# Patient Record
(demographics unavailable — no encounter records)

---

## 2024-10-30 NOTE — DISCUSSION/SUMMARY
[FreeTextEntry1] : ASSESSMENT - Buckle fracture left distal radius with persistent pain and recent onset edema; now spontaneously resolved - No evidence of additional complications or displacement of the fracture  PLAN - Continue wearing the splint 24/7 for another week - After two weeks, wear the splint during school, sleep, and any physical exertion for an additional two weeks - May remove the splint during sedentary activities (watching tv, eating dinner) after two weeks from the injury - If pain exacerbates or if there are sensory or motor deficits, contact the physician for radiographic imaging. Repeat not needed at this time.  - If recovery is protracted by the end of four weeks, consider referral to an orthopedic specialist - May ice and further swelling that develops for 15 minutes. Please monitor for extensive swelling and contact physician if swelling does not resolve.  - Motrin prn for pain - Discussed with mother that it may take up to 4 weeks for full recovery

## 2024-10-30 NOTE — PHYSICAL EXAM
[Alert] : alert [Acute Distress] : no acute distress [TextEntry] : PHYSICAL EXAM  CARDIOVASCULAR: 2+ radial pulse L wrist MUSCULOSKELETAL: L wrist and L fingers do not appear swollen. There is no overlying discoloration to left wrist. Slight pinpoint tenderness at left distal radius. Flexion and extension limited at L wrist. Full ROM of L fingers. FROM at L shoulder and elbow. 4/5 strength L fingers/wrist.  NEUROLOGIC: Sensation intact L hand and all L fingers

## 2024-10-30 NOTE — HISTORY OF PRESENT ILLNESS
[de-identified] : L distal radial buckle fracture follow up [FreeTextEntry6] : HISTORY OF PRESENT ILLNESS - Klaus sustained a L radial distal buckle fracture 10 days ago.  - He has been wearing a splint 27/7 since the injury - He continues to have pain, although it has improved since the initial incident - This morning, while at school, his L thumb became swollen. He went to the nurse, who didn't want to remove the splint. However ultimately the splint was removed and he iced the area, which improved his symptoms.  - Pain level has been consistently around 6/10, with the worst pain (9.9/10) occurring at the time of injury - Pain is not worse than when the injury initially occurred - Pain is felt when moving fingers, particularly around the wrist area - The hand was also swollen on the way to school, but the swelling reduced after applying ice - He took motrin a few times since the injury, but not in the past few days.

## 2025-03-07 NOTE — HISTORY OF PRESENT ILLNESS
[___ stools every other day] : [unfilled]  stools every other day [___ voids per day] : [unfilled] voids per day [Toilet Trained] : toilet trained [Normal] : Normal [In own bed] : In own bed [Brushing teeth twice/d] : brushing teeth twice per day [Yes] : Patient goes to dentist yearly [Tap water] : Primary Fluoride Source: Tap water [Playtime (60 min/d)] : playtime 60 min a day [Participates in after-school activities] : participates in after-school activities [< 2 hrs of screen time per day] : less than 2 hrs of screen time per day [Appropiate parent-child-sibling interaction] : appropriate parent-child-sibling interaction [Has Friends] : has friends [Grade ___] : Grade [unfilled] [Adequate social interactions] : adequate social interactions [Adequate behavior] : adequate behavior [Adequate performance] : adequate performance [No difficulties with Homework] : no difficulties with homework [No] : No cigarette smoke exposure [Appropriately restrained in motor vehicle] : appropriately restrained in motor vehicle [Supervised outdoor play] : supervised outdoor play [Supervised around water] : supervised around water [Wears helmet and pads] : wears helmet and pads [Parent knows child's friends] : parent knows child's friends [Parent discusses safety rules regarding adults] : parent discusses safety rules regarding adults [Monitored computer use] : monitored computer use [Family discusses home emergency plan] : family discusses home emergency plan [Exposure to electronic nicotine delivery system] : Exposure to electronic nicotine delivery system [Up to date] : Up to date [Father] : father [Fruit] : fruit [Vegetables] : vegetables [Meat] : meat [Grains] : grains [Eggs] : eggs [Fish] : fish [Dairy] : dairy [Eats meals with family] : eats meals with family [FreeTextEntry3] : 8:45PM - 6:45AM [de-identified] : [facility] [FreeTextEntry1] : Klaus is a 9 yo healthy M who presents for [facility].  # Routine Diet : Past couple of months diet expanded Protein: Egg whites, chicken, steak, turkey, turkey burgers Fruit: Grapes, apples, blackberries, blueberries, berries, oranges Vegetables: broccoli, spinach leaves, struggles with vegetables. Calcium: cheese, milk with cereal, yogurt Grains: rice  Activities: [employer]. Skiing. [employer].   Screen: 2-3 hours per day Camp: [employer]  Friends: plays with them in a playground

## 2025-03-07 NOTE — HISTORY OF PRESENT ILLNESS
[___ stools every other day] : [unfilled]  stools every other day [___ voids per day] : [unfilled] voids per day [Toilet Trained] : toilet trained [Normal] : Normal [In own bed] : In own bed [Brushing teeth twice/d] : brushing teeth twice per day [Yes] : Patient goes to dentist yearly [Tap water] : Primary Fluoride Source: Tap water [Playtime (60 min/d)] : playtime 60 min a day [Participates in after-school activities] : participates in after-school activities [< 2 hrs of screen time per day] : less than 2 hrs of screen time per day [Appropiate parent-child-sibling interaction] : appropriate parent-child-sibling interaction [Has Friends] : has friends [Grade ___] : Grade [unfilled] [Adequate social interactions] : adequate social interactions [Adequate behavior] : adequate behavior [Adequate performance] : adequate performance [No difficulties with Homework] : no difficulties with homework [No] : No cigarette smoke exposure [Appropriately restrained in motor vehicle] : appropriately restrained in motor vehicle [Supervised outdoor play] : supervised outdoor play [Supervised around water] : supervised around water [Wears helmet and pads] : wears helmet and pads [Parent knows child's friends] : parent knows child's friends [Parent discusses safety rules regarding adults] : parent discusses safety rules regarding adults [Monitored computer use] : monitored computer use [Family discusses home emergency plan] : family discusses home emergency plan [Exposure to electronic nicotine delivery system] : Exposure to electronic nicotine delivery system [Up to date] : Up to date [Father] : father [Fruit] : fruit [Vegetables] : vegetables [Meat] : meat [Grains] : grains [Eggs] : eggs [Fish] : fish [Dairy] : dairy [Eats meals with family] : eats meals with family [FreeTextEntry3] : 8:45PM - 6:45AM [de-identified] : [facility] [FreeTextEntry1] : Klaus is a 7 yo healthy M who presents for [facility].  # Routine Diet : Past couple of months diet expanded Protein: Egg whites, chicken, steak, turkey, turkey burgers Fruit: Grapes, apples, blackberries, blueberries, berries, oranges Vegetables: broccoli, spinach leaves, struggles with vegetables. Calcium: cheese, milk with cereal, yogurt Grains: rice  Activities: [employer]. Skiing. [employer].   Screen: 2-3 hours per day Camp: [employer]  Friends: plays with them in a playground

## 2025-03-07 NOTE — DISCUSSION/SUMMARY
[Normal Growth] : growth [Normal Development] : development [None] : No known medical problems [No Elimination Concerns] : elimination [No Feeding Concerns] : feeding [No Skin Concerns] : skin [Normal Sleep Pattern] : sleep [School] : school [Development and Mental Health] : development and mental health [Nutrition and Physical Activity] : nutrition and physical activity [Oral Health] : oral health [Safety] : safety [No Medications] : ~He/She~ is not on any medications [Patient] : patient [Mother] : mother [Full Activity without restrictions including Physical Education & Athletics] : Full Activity without restrictions including Physical Education & Athletics [FreeTextEntry1] : HELDER is a 8 year yo boy who presents for wce. Growth and development normal for age. Vitals reviewed - normal for age. Hearing and vision screens passed.  #9yo - vutd - Continue balanced diet with all food groups. Brush teeth twice a day with toothbrush. Recommend visit to dentist. Help child to maintain consistent daily routines and sleep schedule. Personal hygiene and puberty explained. School discussed. Ensure home is safe. Teach child about personal safety. Use consistent, positive discipline. Limit screen time to no more than 2 hours per day. Encourage physical activity. - return in 1 yr

## 2025-03-07 NOTE — PHYSICAL EXAM
[Alert] : alert [No Acute Distress] : no acute distress [Normocephalic] : normocephalic [Conjunctivae with no discharge] : conjunctivae with no discharge [PERRL] : PERRL [Auricles Well Formed] : auricles well formed [Clear Tympanic membranes with present light reflex and bony landmarks] : clear tympanic membranes with present light reflex and bony landmarks [No Discharge] : no discharge [Nares Patent] : nares patent [Pink Nasal Mucosa] : pink nasal mucosa [Palate Intact] : palate intact [Nonerythematous Oropharynx] : nonerythematous oropharynx [Supple, full passive range of motion] : supple, full passive range of motion [No Palpable Masses] : no palpable masses [Clear to Auscultation Bilaterally] : clear to auscultation bilaterally [Regular Rate and Rhythm] : regular rate and rhythm [Normal S1, S2 present] : normal S1, S2 present [No Murmurs] : no murmurs [Soft] : soft [NonTender] : non tender [Non Distended] : non distended [Normoactive Bowel Sounds] : normoactive bowel sounds [No Hepatomegaly] : no hepatomegaly [No Splenomegaly] : no splenomegaly [Monty: _____] : Monty [unfilled] [Testicles Descended Bilaterally] : testicles descended bilaterally [No Gait Asymmetry] : no gait asymmetry [No pain or deformities with palpation of bone, muscles, joints] : no pain or deformities with palpation of bone, muscles, joints [Normal Muscle Tone] : normal muscle tone [Straight] : straight [Cranial Nerves Grossly Intact] : cranial nerves grossly intact [No Rash or Lesions] : no rash or lesions

## 2025-03-07 NOTE — DISCUSSION/SUMMARY
[Normal Growth] : growth [Normal Development] : development [None] : No known medical problems [No Elimination Concerns] : elimination [No Feeding Concerns] : feeding [No Skin Concerns] : skin [Normal Sleep Pattern] : sleep [School] : school [Development and Mental Health] : development and mental health [Nutrition and Physical Activity] : nutrition and physical activity [Oral Health] : oral health [Safety] : safety [No Medications] : ~He/She~ is not on any medications [Patient] : patient [Mother] : mother [Full Activity without restrictions including Physical Education & Athletics] : Full Activity without restrictions including Physical Education & Athletics [FreeTextEntry1] : HELDER is a 8 year yo boy who presents for wce. Growth and development normal for age. Vitals reviewed - normal for age. Hearing and vision screens passed.  #7yo - vutd - Continue balanced diet with all food groups. Brush teeth twice a day with toothbrush. Recommend visit to dentist. Help child to maintain consistent daily routines and sleep schedule. Personal hygiene and puberty explained. School discussed. Ensure home is safe. Teach child about personal safety. Use consistent, positive discipline. Limit screen time to no more than 2 hours per day. Encourage physical activity. - return in 1 yr